# Patient Record
Sex: FEMALE | URBAN - METROPOLITAN AREA
[De-identification: names, ages, dates, MRNs, and addresses within clinical notes are randomized per-mention and may not be internally consistent; named-entity substitution may affect disease eponyms.]

---

## 2018-06-04 ENCOUNTER — APPOINTMENT (RX ONLY)
Dept: URBAN - METROPOLITAN AREA CLINIC 116 | Facility: CLINIC | Age: 78
Setting detail: DERMATOLOGY
End: 2018-06-04

## 2018-06-04 DIAGNOSIS — L29.89 OTHER PRURITUS: ICD-10-CM

## 2018-06-04 DIAGNOSIS — L23.9 ALLERGIC CONTACT DERMATITIS, UNSPECIFIED CAUSE: ICD-10-CM

## 2018-06-04 DIAGNOSIS — L82.1 OTHER SEBORRHEIC KERATOSIS: ICD-10-CM

## 2018-06-04 DIAGNOSIS — D69.2 OTHER NONTHROMBOCYTOPENIC PURPURA: ICD-10-CM

## 2018-06-04 DIAGNOSIS — L91.8 OTHER HYPERTROPHIC DISORDERS OF THE SKIN: ICD-10-CM

## 2018-06-04 PROBLEM — L29.8 OTHER PRURITUS: Status: ACTIVE | Noted: 2018-06-04

## 2018-06-04 PROBLEM — E78.5 HYPERLIPIDEMIA, UNSPECIFIED: Status: ACTIVE | Noted: 2018-06-04

## 2018-06-04 PROBLEM — Z92.3 PERSONAL HISTORY OF IRRADIATION: Status: ACTIVE | Noted: 2018-06-04

## 2018-06-04 PROBLEM — M12.9 ARTHROPATHY, UNSPECIFIED: Status: ACTIVE | Noted: 2018-06-04

## 2018-06-04 PROBLEM — I10 ESSENTIAL (PRIMARY) HYPERTENSION: Status: ACTIVE | Noted: 2018-06-04

## 2018-06-04 PROBLEM — Z85.3 PERSONAL HISTORY OF MALIGNANT NEOPLASM OF BREAST: Status: ACTIVE | Noted: 2018-06-04

## 2018-06-04 PROCEDURE — 99202 OFFICE O/P NEW SF 15 MIN: CPT

## 2018-06-04 PROCEDURE — ? COUNSELING

## 2018-06-04 PROCEDURE — ? TREATMENT REGIMEN

## 2018-06-04 PROCEDURE — ? PRESCRIPTION

## 2018-06-04 RX ORDER — TRIAMCINOLONE ACETONIDE 0.25 MG/G
CREAM TOPICAL
Qty: 1 | Refills: 0 | Status: ERX | COMMUNITY
Start: 2018-06-04

## 2018-06-04 RX ADMIN — TRIAMCINOLONE ACETONIDE: 0.25 CREAM TOPICAL at 15:22

## 2018-06-04 ASSESSMENT — LOCATION DETAILED DESCRIPTION DERM
LOCATION DETAILED: LEFT SUPERIOR LATERAL NECK
LOCATION DETAILED: LEFT PROXIMAL DORSAL FOREARM
LOCATION DETAILED: LEFT SUPERIOR ANTERIOR NECK
LOCATION DETAILED: RIGHT DISTAL DORSAL FOREARM
LOCATION DETAILED: LEFT DISTAL DORSAL FOREARM
LOCATION DETAILED: RIGHT SUPERIOR LATERAL NECK

## 2018-06-04 ASSESSMENT — LOCATION SIMPLE DESCRIPTION DERM
LOCATION SIMPLE: RIGHT FOREARM
LOCATION SIMPLE: LEFT FOREARM
LOCATION SIMPLE: LEFT ANTERIOR NECK
LOCATION SIMPLE: RIGHT ANTERIOR NECK

## 2018-06-04 ASSESSMENT — LOCATION ZONE DERM
LOCATION ZONE: NECK
LOCATION ZONE: ARM

## 2018-06-04 NOTE — PROCEDURE: TREATMENT REGIMEN
Detail Level: Zone
Discontinue Regimen: Harsh soaps (Dial, ivory, [place] spring) \\nTide and [employer] laundry detergent \\nBounce & downy dryer sheets / fabric sheets.
Initiate Treatment: Advised Pt to wash with gentle cleansers (Dove, Cetaphil, Aveeno) in [facility] warm water. Pt was also advised to use All free and clear laundry detergent. Pt was also advised to take a Zyrtec, Claritin or Allegra daily.

## 2020-06-16 ENCOUNTER — OFFICE VISIT (OUTPATIENT)
Dept: URBAN - METROPOLITAN AREA CLINIC 60 | Facility: CLINIC | Age: 80
End: 2020-06-16

## 2020-06-18 LAB
BASO (ABSOLUTE): (no result)
BASOS: (no result)
EOS (ABSOLUTE): (no result)
EOS: (no result)
FERRITIN, SERUM: (no result)
FOLATE (FOLIC ACID), SERUM: (no result)
HEMATOCRIT: (no result)
HEMATOLOGY COMMENTS:: (no result)
HEMOGLOBIN: (no result)
IMMATURE CELLS: (no result)
IMMATURE GRANS (ABS): (no result)
IMMATURE GRANULOCYTES: (no result)
IRON BIND.CAP.(TIBC): (no result)
IRON SATURATION: (no result)
IRON: (no result)
LYMPHS (ABSOLUTE): (no result)
LYMPHS: (no result)
MCH: (no result)
MCHC: (no result)
MCV: (no result)
MONOCYTES(ABSOLUTE): (no result)
MONOCYTES: (no result)
NEUTROPHILS (ABSOLUTE): (no result)
NEUTROPHILS: (no result)
NRBC: (no result)
PLATELETS: (no result)
RBC: (no result)
RDW: (no result)
UIBC: (no result)
VITAMIN B12: (no result)
WBC: (no result)

## 2020-07-22 LAB
A/G RATIO: 1.6
ALBUMIN: (no result)
ALKALINE PHOSPHATASE: (no result)
ALT (SGPT): (no result)
AST (SGOT): (no result)
BASO (ABSOLUTE): (no result)
BASOS: (no result)
BILIRUBIN, TOTAL: (no result)
BUN/CREATININE RATIO: 23
BUN: (no result)
CALCIUM: (no result)
CARBON DIOXIDE, TOTAL: (no result)
CHLORIDE: (no result)
CREATININE: (no result)
EGFR IF AFRICN AM: (no result)
EGFR IF NONAFRICN AM: (no result)
EOS (ABSOLUTE): (no result)
EOS: (no result)
FERRITIN, SERUM: (no result)
GLOBULIN, TOTAL: (no result)
GLUCOSE: (no result)
HEMATOCRIT: (no result)
HEMATOLOGY COMMENTS:: (no result)
HEMOGLOBIN: (no result)
IMMATURE CELLS: (no result)
IMMATURE GRANS (ABS): (no result)
IMMATURE GRANULOCYTES: (no result)
INR: 1
IRON BIND.CAP.(TIBC): (no result)
IRON SATURATION: (no result)
IRON: (no result)
LYMPHS (ABSOLUTE): (no result)
LYMPHS: (no result)
MCH: (no result)
MCHC: (no result)
MCV: (no result)
MONOCYTES(ABSOLUTE): (no result)
MONOCYTES: (no result)
NEUTROPHILS (ABSOLUTE): (no result)
NEUTROPHILS: (no result)
NRBC: (no result)
PLATELETS: (no result)
POTASSIUM: (no result)
PROTEIN, TOTAL: (no result)
PROTHROMBIN TIME: (no result)
RBC: (no result)
RDW: (no result)
SODIUM: (no result)
UIBC: (no result)
WBC: (no result)

## 2020-07-29 ENCOUNTER — OFFICE VISIT (OUTPATIENT)
Dept: URBAN - METROPOLITAN AREA SURGERY CENTER 4 | Facility: SURGERY CENTER | Age: 80
End: 2020-07-29

## 2020-07-31 LAB — PATHOLOGY (INDENTED REPORT): (no result)

## 2020-08-12 ENCOUNTER — OFFICE VISIT (OUTPATIENT)
Dept: URBAN - METROPOLITAN AREA CLINIC 63 | Facility: CLINIC | Age: 80
End: 2020-08-12

## 2022-07-09 ENCOUNTER — TELEPHONE ENCOUNTER (OUTPATIENT)
Dept: URBAN - METROPOLITAN AREA CLINIC 121 | Facility: CLINIC | Age: 82
End: 2022-07-09

## 2022-07-09 RX ORDER — POTASSIUM CHLORIDE 750 MG/1
TABLET, FILM COATED, EXTENDED RELEASE ORAL
Refills: 0 | OUTPATIENT
Start: 2013-04-10 | End: 2020-06-16

## 2022-07-09 RX ORDER — SIMVASTATIN 20 MG/1
TABLET, FILM COATED ORAL ONCE A DAY
Refills: 0 | OUTPATIENT
Start: 2020-06-15 | End: 2020-06-16

## 2022-07-09 RX ORDER — POTASSIUM CHLORIDE 750 MG/1
TABLET, EXTENDED RELEASE ORAL ONCE A DAY
Refills: 0 | OUTPATIENT
Start: 2020-06-15 | End: 2020-06-16

## 2022-07-09 RX ORDER — TRAVOPROST 0.04 MG/ML
SOLUTION/ DROPS OPHTHALMIC
Refills: 0 | OUTPATIENT
Start: 2020-06-15 | End: 2020-06-16

## 2022-07-09 RX ORDER — HYDROCHLOROTHIAZIDE 25 MG/1
TABLET ORAL
Refills: 0 | OUTPATIENT
Start: 2013-04-10 | End: 2020-06-15

## 2022-07-09 RX ORDER — METOPROLOL SUCCINATE 50 MG/1
TABLET, EXTENDED RELEASE ORAL
Refills: 0 | OUTPATIENT
Start: 2010-01-21 | End: 2013-04-10

## 2022-07-09 RX ORDER — IBUPROFEN 200 MG/1
TABLET, COATED ORAL
Refills: 0 | OUTPATIENT
Start: 2010-01-21 | End: 2013-04-10

## 2022-07-09 RX ORDER — TRIAMTERENE AND HYDROCHLOROTHIAZIDE 37.5; 25 MG/1; MG/1
CAPSULE ORAL
Refills: 0 | OUTPATIENT
Start: 2010-01-21 | End: 2013-04-10

## 2022-07-09 RX ORDER — GABAPENTIN 100 MG/1
CAPSULE ORAL THREE TIMES A DAY
Refills: 0 | OUTPATIENT
Start: 2020-06-15 | End: 2020-06-16

## 2022-07-09 RX ORDER — GABAPENTIN 600 MG/1
TABLET ORAL
Refills: 0 | OUTPATIENT
Start: 2010-01-21 | End: 2013-04-10

## 2022-07-09 RX ORDER — AMLODIPINE BESYLATE 10 MG/1
TABLET ORAL
Refills: 0 | OUTPATIENT
Start: 2013-04-10 | End: 2020-06-16

## 2022-07-09 RX ORDER — DICYCLOMINE HYDROCHLORIDE 20 MG/1
PRN TABLET ORAL TAKE AS DIRECTED
Refills: 0 | OUTPATIENT
Start: 2020-06-15 | End: 2020-06-16

## 2022-07-09 RX ORDER — LISINOPRIL 20 MG/1
TABLET ORAL
Refills: 0 | OUTPATIENT
Start: 2010-01-21 | End: 2013-04-10

## 2022-07-09 RX ORDER — ESOMEPRAZOLE MAGNESIUM 40 MG
CAPSULE,DELAYED RELEASE (ENTERIC COATED) ORAL
Refills: 0 | OUTPATIENT
Start: 2013-04-10 | End: 2020-06-15

## 2022-07-09 RX ORDER — CLONIDINE HYDROCHLORIDE 0.1 MG/1
PRN >160 SBP TABLET ORAL ONCE A DAY
Refills: 0 | OUTPATIENT
Start: 2020-06-15 | End: 2020-06-16

## 2022-07-09 RX ORDER — LOSARTAN POTASSIUM 100 MG/1
TABLET, FILM COATED ORAL
Refills: 0 | OUTPATIENT
Start: 2013-04-10 | End: 2020-06-16

## 2022-07-09 RX ORDER — TRAVOPROST 0.04 MG/ML
SOLUTION/ DROPS OPHTHALMIC
Refills: 0 | OUTPATIENT
Start: 2013-04-10 | End: 2020-06-16

## 2022-07-09 RX ORDER — OMEPRAZOLE 20 MG/1
TABLET, DELAYED RELEASE ORAL
Refills: 0 | OUTPATIENT
Start: 2010-01-21 | End: 2013-04-10

## 2022-07-09 RX ORDER — POLYETHYLENE GLYCOL 1450
POWDER (GRAM) MISCELLANEOUS ONCE A DAY
Refills: 0 | OUTPATIENT
Start: 2020-06-15 | End: 2020-06-16

## 2022-07-09 RX ORDER — LATANOPROST/PF 0.005 %
DROPS OPHTHALMIC (EYE) ONCE A DAY
Refills: 0 | OUTPATIENT
Start: 2020-06-15 | End: 2020-06-16

## 2022-07-09 RX ORDER — SIMVASTATIN 20 MG/1
TABLET, FILM COATED ORAL
Refills: 0 | OUTPATIENT
Start: 2010-01-21 | End: 2013-04-10

## 2022-07-09 RX ORDER — OMEPRAZOLE 20 MG/1
TABLET, ORALLY DISINTEGRATING, DELAYED RELEASE ORAL ONCE A DAY
Refills: 0 | OUTPATIENT
Start: 2020-06-15 | End: 2020-06-16

## 2022-07-10 ENCOUNTER — TELEPHONE ENCOUNTER (OUTPATIENT)
Dept: URBAN - METROPOLITAN AREA CLINIC 121 | Facility: CLINIC | Age: 82
End: 2022-07-10

## 2022-07-10 RX ORDER — TRIAMTERENE AND HYDROCHLOROTHIAZIDE 37.5; 25 MG/1; MG/1
CAPSULE ORAL
Refills: 0 | Status: ACTIVE | COMMUNITY
Start: 2009-12-17

## 2022-07-10 RX ORDER — GABAPENTIN 600 MG/1
TABLET ORAL
Refills: 0 | Status: ACTIVE | COMMUNITY
Start: 2013-04-10

## 2022-07-10 RX ORDER — OMEPRAZOLE 20 MG/1
TABLET, DELAYED RELEASE ORAL
Refills: 0 | Status: ACTIVE | COMMUNITY
Start: 2009-12-17

## 2022-07-10 RX ORDER — METOPROLOL SUCCINATE 50 MG/1
TABLET, EXTENDED RELEASE ORAL
Refills: 0 | Status: ACTIVE | COMMUNITY
Start: 2009-12-17

## 2022-07-10 RX ORDER — IBUPROFEN 200 MG/1
TABLET, COATED ORAL
Refills: 0 | Status: ACTIVE | COMMUNITY
Start: 2009-12-17

## 2022-07-10 RX ORDER — SIMVASTATIN 20 MG/1
TABLET, FILM COATED ORAL
Refills: 0 | Status: ACTIVE | COMMUNITY
Start: 2009-12-17

## 2022-07-10 RX ORDER — OMEPRAZOLE 20 MG/1
TABLET, DELAYED RELEASE ORAL
Refills: 6 | Status: ACTIVE | COMMUNITY
Start: 2010-01-21

## 2022-07-10 RX ORDER — LATANOPROST/PF 0.005 %
DROPS OPHTHALMIC (EYE) ONCE A DAY
Refills: 0 | Status: ACTIVE | COMMUNITY
Start: 2020-06-16

## 2022-07-10 RX ORDER — TRAVOPROST 0.04 MG/ML
SOLUTION/ DROPS OPHTHALMIC
Refills: 0 | Status: ACTIVE | COMMUNITY
Start: 2020-06-16

## 2022-07-10 RX ORDER — POTASSIUM CHLORIDE 750 MG/1
TABLET, EXTENDED RELEASE ORAL ONCE A DAY
Refills: 0 | Status: ACTIVE | COMMUNITY
Start: 2020-06-16

## 2022-07-10 RX ORDER — DICYCLOMINE HYDROCHLORIDE 20 MG/1
PRN TABLET ORAL TAKE AS DIRECTED
Refills: 0 | Status: ACTIVE | COMMUNITY
Start: 2020-06-16

## 2022-07-10 RX ORDER — OMEPRAZOLE 20 MG/1
TABLET, ORALLY DISINTEGRATING, DELAYED RELEASE ORAL ONCE A DAY
Refills: 0 | Status: ACTIVE | COMMUNITY
Start: 2020-06-16

## 2022-07-10 RX ORDER — SIMVASTATIN 20 MG/1
TABLET, FILM COATED ORAL
Refills: 0 | Status: ACTIVE | COMMUNITY
Start: 2013-04-10

## 2022-07-10 RX ORDER — POLYETHYLENE GLYCOL 1450
POWDER (GRAM) MISCELLANEOUS ONCE A DAY
Refills: 0 | Status: ACTIVE | COMMUNITY
Start: 2020-06-16

## 2022-07-10 RX ORDER — GABAPENTIN 100 MG/1
CAPSULE ORAL THREE TIMES A DAY
Refills: 0 | Status: ACTIVE | COMMUNITY
Start: 2020-06-16

## 2022-07-10 RX ORDER — LISINOPRIL 20 MG/1
TABLET ORAL
Refills: 0 | Status: ACTIVE | COMMUNITY
Start: 2009-12-17

## 2022-07-10 RX ORDER — CLONIDINE HYDROCHLORIDE 0.1 MG/1
PRN >160 SBP TABLET ORAL ONCE A DAY
Refills: 0 | Status: ACTIVE | COMMUNITY
Start: 2020-06-16

## 2022-07-10 RX ORDER — SIMVASTATIN 20 MG/1
TABLET, FILM COATED ORAL ONCE A DAY
Refills: 0 | Status: ACTIVE | COMMUNITY
Start: 2020-06-16

## 2022-08-03 ENCOUNTER — TELEPHONE ENCOUNTER (OUTPATIENT)
Dept: URBAN - METROPOLITAN AREA CLINIC 63 | Facility: CLINIC | Age: 82
End: 2022-08-03

## 2022-08-04 ENCOUNTER — TELEPHONE ENCOUNTER (OUTPATIENT)
Dept: URBAN - METROPOLITAN AREA CLINIC 63 | Facility: CLINIC | Age: 82
End: 2022-08-04

## 2022-08-08 ENCOUNTER — WEB ENCOUNTER (OUTPATIENT)
Dept: URBAN - METROPOLITAN AREA CLINIC 63 | Facility: CLINIC | Age: 82
End: 2022-08-08

## 2022-08-08 ENCOUNTER — OFFICE VISIT (OUTPATIENT)
Dept: URBAN - METROPOLITAN AREA CLINIC 63 | Facility: CLINIC | Age: 82
End: 2022-08-08
Payer: COMMERCIAL

## 2022-08-08 VITALS
HEIGHT: 59 IN | OXYGEN SATURATION: 98 % | BODY MASS INDEX: 35.96 KG/M2 | SYSTOLIC BLOOD PRESSURE: 136 MMHG | WEIGHT: 178.4 LBS | TEMPERATURE: 98.3 F | DIASTOLIC BLOOD PRESSURE: 79 MMHG | HEART RATE: 64 BPM

## 2022-08-08 DIAGNOSIS — K92.1 HEMATOCHEZIA: ICD-10-CM

## 2022-08-08 PROCEDURE — 99214 OFFICE O/P EST MOD 30 MIN: CPT | Performed by: NURSE PRACTITIONER

## 2022-08-08 RX ORDER — GABAPENTIN 300 MG/1
1 CAPSULE CAPSULE ORAL BID
Refills: 0 | Status: ACTIVE | COMMUNITY
Start: 2013-04-10

## 2022-08-08 RX ORDER — HYDROCHLOROTHIAZIDE 25 MG/1
1 TABLET IN THE MORNING TABLET ORAL ONCE A DAY
Status: ACTIVE | COMMUNITY

## 2022-08-08 RX ORDER — POLYETHYLENE GLYCOL 1450
POWDER (GRAM) MISCELLANEOUS ONCE A DAY
Refills: 0 | Status: ACTIVE | COMMUNITY
Start: 2020-06-16

## 2022-08-08 RX ORDER — OMEPRAZOLE 20 MG/1
1 TABLET 30 MINUTES BEFORE MORNING MEAL TABLET, DELAYED RELEASE ORAL ONCE A DAY
Refills: 0 | Status: ACTIVE | COMMUNITY
Start: 2009-12-17

## 2022-08-08 RX ORDER — TRAVOPROST 0.04 MG/ML
SOLUTION/ DROPS OPHTHALMIC
Refills: 0 | Status: ACTIVE | COMMUNITY
Start: 2020-06-16

## 2022-08-08 RX ORDER — CLONIDINE HYDROCHLORIDE 0.1 MG/1
1 TABLET TABLET ORAL PRN
Refills: 0 | Status: ACTIVE | COMMUNITY
Start: 2020-06-16

## 2022-08-08 RX ORDER — ATORVASTATIN CALCIUM 40 MG/1
1 TABLET TABLET, FILM COATED ORAL ONCE A DAY
Status: ACTIVE | COMMUNITY

## 2022-08-08 RX ORDER — DICYCLOMINE HYDROCHLORIDE 20 MG/1
PRN TABLET ORAL TAKE AS DIRECTED
Refills: 0 | Status: ACTIVE | COMMUNITY
Start: 2020-06-16

## 2022-08-08 RX ORDER — SACUBITRIL AND VALSARTAN 24; 26 MG/1; MG/1
1 TABLET TABLET, FILM COATED ORAL TWICE A DAY
Status: ACTIVE | COMMUNITY

## 2022-08-08 RX ORDER — FUROSEMIDE 20 MG/1
1 TABLET TABLET ORAL ONCE A DAY
Status: ACTIVE | COMMUNITY

## 2022-08-08 RX ORDER — LATANOPROST/PF 0.005 %
DROPS OPHTHALMIC (EYE) ONCE A DAY
Refills: 0 | Status: ACTIVE | COMMUNITY
Start: 2020-06-16

## 2022-08-08 NOTE — HPI-TODAY'S VISIT:
This is an 82-year-old female who presents to the office for evaluation of blood in her stool.  She was last seen by us in June 2020.  Today she reports that she started a medication on 6/13/22 for chronic UTIs (Trospium) and then started seeing blood in her stools. She stopped the medication and she has not seen any further bleeding. She denies any current bowel problems. No constipation, diarrhea, abdominal pain or weight loss. No heartburn or reflux if she takes her Omeprazole. Her appetite is good.  When last seen in the office she was anemic and heme positive.  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  An EGD colonoscopy were ordered.  EGD on 7/29/2020 was essentially unremarkable for cause.  However her colonoscopy on that date revealed a 15mm polypoid, multilobulated lesion in the distal rectum.  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  A further 10 mm polyp removed from the rectum was a tubular adenoma.  She canceled her follow-up appointment due to a family member having COVID and did not call back to reschedule.  Recalls were sent to her which she did not respond to. She states today that she felt well and so didn't think she had to come in. Of note is that she is a poor historian. She reports undergoing iron infusions periodically for anemia. She cannot recall with whom, or when her last one was. She also has a cardiologist that she sees every 6 months, but isn't sure why. She denies stents or MI.

## 2022-08-08 NOTE — HPI-PREVIOUS PROCEDURES
Colonoscopy in July 2020 revealed a 15mm polypoid, multilobulated lesion in the distal rectum.  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  A further 10 mm polyp removed from the rectum was a tubular adenoma.

## 2022-08-15 ENCOUNTER — LAB OUTSIDE AN ENCOUNTER (OUTPATIENT)
Dept: URBAN - METROPOLITAN AREA CLINIC 63 | Facility: CLINIC | Age: 82
End: 2022-08-15

## 2022-08-15 ENCOUNTER — TELEPHONE ENCOUNTER (OUTPATIENT)
Dept: URBAN - METROPOLITAN AREA CLINIC 63 | Facility: CLINIC | Age: 82
End: 2022-08-15

## 2022-08-17 ENCOUNTER — OFFICE VISIT (OUTPATIENT)
Dept: URBAN - METROPOLITAN AREA SURGERY CENTER 4 | Facility: SURGERY CENTER | Age: 82
End: 2022-08-17

## 2022-08-23 ENCOUNTER — OFFICE VISIT (OUTPATIENT)
Dept: URBAN - METROPOLITAN AREA SURGERY CENTER 4 | Facility: SURGERY CENTER | Age: 82
End: 2022-08-23

## 2022-08-23 ENCOUNTER — CLAIMS CREATED FROM THE CLAIM WINDOW (OUTPATIENT)
Dept: URBAN - METROPOLITAN AREA CLINIC 61 | Facility: CLINIC | Age: 82
End: 2022-08-23
Payer: COMMERCIAL

## 2022-08-23 ENCOUNTER — CLAIMS CREATED FROM THE CLAIM WINDOW (OUTPATIENT)
Dept: URBAN - METROPOLITAN AREA SURGERY CENTER 4 | Facility: SURGERY CENTER | Age: 82
End: 2022-08-23
Payer: COMMERCIAL

## 2022-08-23 DIAGNOSIS — K55.20 ANGIODYSPLASIA OF COLON WITHOUT BLEEDING: ICD-10-CM

## 2022-08-23 DIAGNOSIS — K62.89 RECTAL MASS: ICD-10-CM

## 2022-08-23 DIAGNOSIS — D12.0 POLYP OF CECUM: ICD-10-CM

## 2022-08-23 DIAGNOSIS — K62.1 RECTAL POLYP: ICD-10-CM

## 2022-08-23 DIAGNOSIS — K62.5 RECTAL BLEEDING: ICD-10-CM

## 2022-08-23 DIAGNOSIS — D49.0 NEOPLASM OF UNSPECIFIED BEHAVIOR OF DIGESTIVE SYSTEM: ICD-10-CM

## 2022-08-23 DIAGNOSIS — D12.8 BENIGN NEOPLASM OF RECTUM: ICD-10-CM

## 2022-08-23 PROCEDURE — 45380 COLONOSCOPY AND BIOPSY: CPT | Performed by: INTERNAL MEDICINE

## 2022-08-23 PROCEDURE — 45385 COLONOSCOPY W/LESION REMOVAL: CPT | Performed by: INTERNAL MEDICINE

## 2022-08-23 PROCEDURE — 45385 COLONOSCOPY W/LESION REMOVAL: CPT | Performed by: CLINIC/CENTER

## 2022-08-23 PROCEDURE — G8907 PT DOC NO EVENTS ON DISCHARG: HCPCS | Performed by: CLINIC/CENTER

## 2022-08-23 PROCEDURE — 88305 TISSUE EXAM BY PATHOLOGIST: CPT | Performed by: INTERNAL MEDICINE

## 2022-08-23 PROCEDURE — G8918 PT W/O PREOP ORDER IV AB PRO: HCPCS | Performed by: CLINIC/CENTER

## 2022-08-23 PROCEDURE — 45380 COLONOSCOPY AND BIOPSY: CPT | Performed by: CLINIC/CENTER

## 2022-08-23 RX ORDER — CLONIDINE HYDROCHLORIDE 0.1 MG/1
1 TABLET TABLET ORAL PRN
Refills: 0 | Status: ACTIVE | COMMUNITY
Start: 2020-06-16

## 2022-08-23 RX ORDER — GABAPENTIN 300 MG/1
1 CAPSULE CAPSULE ORAL BID
Refills: 0 | Status: ACTIVE | COMMUNITY
Start: 2013-04-10

## 2022-08-23 RX ORDER — POLYETHYLENE GLYCOL 1450
POWDER (GRAM) MISCELLANEOUS ONCE A DAY
Refills: 0 | Status: ACTIVE | COMMUNITY
Start: 2020-06-16

## 2022-08-23 RX ORDER — TRAVOPROST 0.04 MG/ML
SOLUTION/ DROPS OPHTHALMIC
Refills: 0 | Status: ACTIVE | COMMUNITY
Start: 2020-06-16

## 2022-08-23 RX ORDER — ATORVASTATIN CALCIUM 40 MG/1
1 TABLET TABLET, FILM COATED ORAL ONCE A DAY
Status: ACTIVE | COMMUNITY

## 2022-08-23 RX ORDER — HYDROCHLOROTHIAZIDE 25 MG/1
1 TABLET IN THE MORNING TABLET ORAL ONCE A DAY
Status: ACTIVE | COMMUNITY

## 2022-08-23 RX ORDER — OMEPRAZOLE 20 MG/1
1 TABLET 30 MINUTES BEFORE MORNING MEAL TABLET, DELAYED RELEASE ORAL ONCE A DAY
Refills: 0 | Status: ACTIVE | COMMUNITY
Start: 2009-12-17

## 2022-08-23 RX ORDER — DICYCLOMINE HYDROCHLORIDE 20 MG/1
PRN TABLET ORAL TAKE AS DIRECTED
Refills: 0 | Status: ACTIVE | COMMUNITY
Start: 2020-06-16

## 2022-08-23 RX ORDER — LATANOPROST/PF 0.005 %
DROPS OPHTHALMIC (EYE) ONCE A DAY
Refills: 0 | Status: ACTIVE | COMMUNITY
Start: 2020-06-16

## 2022-08-23 RX ORDER — FUROSEMIDE 20 MG/1
1 TABLET TABLET ORAL ONCE A DAY
Status: ACTIVE | COMMUNITY

## 2022-08-23 RX ORDER — SACUBITRIL AND VALSARTAN 24; 26 MG/1; MG/1
1 TABLET TABLET, FILM COATED ORAL TWICE A DAY
Status: ACTIVE | COMMUNITY

## 2022-08-31 ENCOUNTER — TELEPHONE ENCOUNTER (OUTPATIENT)
Dept: URBAN - METROPOLITAN AREA CLINIC 63 | Facility: CLINIC | Age: 82
End: 2022-08-31

## 2022-08-31 ENCOUNTER — OFFICE VISIT (OUTPATIENT)
Dept: URBAN - METROPOLITAN AREA CLINIC 63 | Facility: CLINIC | Age: 82
End: 2022-08-31

## 2022-09-01 ENCOUNTER — TELEPHONE ENCOUNTER (OUTPATIENT)
Dept: URBAN - METROPOLITAN AREA CLINIC 63 | Facility: CLINIC | Age: 82
End: 2022-09-01

## 2022-09-06 ENCOUNTER — OFFICE VISIT (OUTPATIENT)
Dept: URBAN - METROPOLITAN AREA CLINIC 60 | Facility: CLINIC | Age: 82
End: 2022-09-06
Payer: COMMERCIAL

## 2022-09-06 ENCOUNTER — WEB ENCOUNTER (OUTPATIENT)
Dept: URBAN - METROPOLITAN AREA CLINIC 60 | Facility: CLINIC | Age: 82
End: 2022-09-06

## 2022-09-06 VITALS
OXYGEN SATURATION: 95 % | TEMPERATURE: 97 F | HEIGHT: 59 IN | HEART RATE: 85 BPM | BODY MASS INDEX: 36 KG/M2 | SYSTOLIC BLOOD PRESSURE: 128 MMHG | DIASTOLIC BLOOD PRESSURE: 84 MMHG | WEIGHT: 178.6 LBS

## 2022-09-06 DIAGNOSIS — K62.1 RECTAL POLYP: ICD-10-CM

## 2022-09-06 PROCEDURE — 99213 OFFICE O/P EST LOW 20 MIN: CPT | Performed by: NURSE PRACTITIONER

## 2022-09-06 RX ORDER — FUROSEMIDE 20 MG/1
1 TABLET TABLET ORAL ONCE A DAY
COMMUNITY

## 2022-09-06 RX ORDER — TRAVOPROST 0.04 MG/ML
SOLUTION/ DROPS OPHTHALMIC
Refills: 0 | COMMUNITY
Start: 2020-06-16

## 2022-09-06 RX ORDER — POLYETHYLENE GLYCOL 1450
POWDER (GRAM) MISCELLANEOUS ONCE A DAY
Refills: 0 | COMMUNITY
Start: 2020-06-16

## 2022-09-06 RX ORDER — GABAPENTIN 300 MG/1
1 CAPSULE CAPSULE ORAL BID
Refills: 0 | COMMUNITY
Start: 2013-04-10

## 2022-09-06 RX ORDER — CLONIDINE HYDROCHLORIDE 0.1 MG/1
1 TABLET TABLET ORAL PRN
Refills: 0 | COMMUNITY
Start: 2020-06-16

## 2022-09-06 RX ORDER — SACUBITRIL AND VALSARTAN 24; 26 MG/1; MG/1
1 TABLET TABLET, FILM COATED ORAL TWICE A DAY
COMMUNITY

## 2022-09-06 RX ORDER — DICYCLOMINE HYDROCHLORIDE 20 MG/1
PRN TABLET ORAL TAKE AS DIRECTED
Refills: 0 | COMMUNITY
Start: 2020-06-16

## 2022-09-06 RX ORDER — LATANOPROST/PF 0.005 %
DROPS OPHTHALMIC (EYE) ONCE A DAY
Refills: 0 | COMMUNITY
Start: 2020-06-16

## 2022-09-06 RX ORDER — OMEPRAZOLE 20 MG/1
1 TABLET 30 MINUTES BEFORE MORNING MEAL TABLET, DELAYED RELEASE ORAL ONCE A DAY
Refills: 0 | COMMUNITY
Start: 2009-12-17

## 2022-09-06 RX ORDER — ATORVASTATIN CALCIUM 40 MG/1
1 TABLET TABLET, FILM COATED ORAL ONCE A DAY
COMMUNITY

## 2022-09-06 RX ORDER — HYDROCHLOROTHIAZIDE 25 MG/1
1 TABLET IN THE MORNING TABLET ORAL ONCE A DAY
COMMUNITY

## 2022-09-06 NOTE — HPI-TODAY'S VISIT:
This is an 82-year-old female who presents to the office for follow-up after recent colonoscopy.  Today she reports doing well. She always has a lot "noise and gas" in her abdomen, but denies abdominal pain, appetite change or weight loss.

## 2022-09-06 NOTE — HPI-PREVIOUS PROCEDURES
Findings included a 12 mm sessile tubular adenoma removed from the cecum and a 10 mm tubular adenoma in the proximal rectum.   This polyp spreads from the distal rectum into the anal canal the polyp was sessile and removed with a hot snare. There was a 25 mm polypoid lesion in the distal rectum near the anal canal which was multilobulated and biopsied.  Pathology reports this to be tubulovillous adenoma.  A single small localized AVM was found in the proximal ascending colon, nonbleeding.  Diverticulosis was found in the descending and sigmoid colon and small internal hemorrhoids noted.  To review, this patient had a colonoscopy in July 2020 and the large lobulated lesion in the rectum was noted at that time.  The pathology at that time reported tubulovillous adenoma with high-grade dysplasia.  She did not keep her follow-up at that time as her daughter had COVID and she did not respond to our subsequent recalls.  Recently she presented to our office for reports of blood in her stool.

## 2022-09-06 NOTE — HPI-PREVIOUS LABS
She obtained the CBC CMP iron studies and PT/INR study, but did not do the CEA due to financial concerns.

## 2022-10-12 ENCOUNTER — OFFICE VISIT (OUTPATIENT)
Dept: URBAN - METROPOLITAN AREA SURGERY CENTER 4 | Facility: SURGERY CENTER | Age: 82
End: 2022-10-12

## 2022-10-26 ENCOUNTER — OFFICE VISIT (OUTPATIENT)
Dept: URBAN - METROPOLITAN AREA CLINIC 63 | Facility: CLINIC | Age: 82
End: 2022-10-26

## 2023-08-28 NOTE — PHYSICAL EXAM HENT:
Head , atraumatic Left message to return call and that writer will respond via MyC message to pt since writer was not able to a hold of pt via phone.    Please route to RN queue to gather more info when pt returns call.    REN EstradaN, RN  [facility]

## 2023-12-27 ENCOUNTER — OFFICE VISIT (OUTPATIENT)
Dept: URBAN - METROPOLITAN AREA CLINIC 63 | Facility: CLINIC | Age: 83
End: 2023-12-27
Payer: COMMERCIAL

## 2023-12-27 ENCOUNTER — DASHBOARD ENCOUNTERS (OUTPATIENT)
Age: 83
End: 2023-12-27

## 2023-12-27 VITALS
DIASTOLIC BLOOD PRESSURE: 72 MMHG | WEIGHT: 175 LBS | RESPIRATION RATE: 20 BRPM | TEMPERATURE: 97.3 F | SYSTOLIC BLOOD PRESSURE: 130 MMHG | BODY MASS INDEX: 35.28 KG/M2 | HEIGHT: 59 IN | OXYGEN SATURATION: 96 % | HEART RATE: 58 BPM

## 2023-12-27 DIAGNOSIS — Z86.010 HISTORY OF COLON POLYPS: ICD-10-CM

## 2023-12-27 PROBLEM — 428283002: Status: ACTIVE | Noted: 2023-12-27

## 2023-12-27 PROCEDURE — 99214 OFFICE O/P EST MOD 30 MIN: CPT | Performed by: NURSE PRACTITIONER

## 2023-12-27 RX ORDER — TRAVOPROST 0.04 MG/ML
SOLUTION/ DROPS OPHTHALMIC
Refills: 0 | Status: ACTIVE | COMMUNITY
Start: 2020-06-16

## 2023-12-27 RX ORDER — FUROSEMIDE 20 MG/1
1 TABLET TABLET ORAL ONCE A DAY
Status: ACTIVE | COMMUNITY

## 2023-12-27 RX ORDER — OMEPRAZOLE 20 MG/1
1 TABLET 30 MINUTES BEFORE MORNING MEAL TABLET, DELAYED RELEASE ORAL ONCE A DAY
Refills: 0 | Status: ACTIVE | COMMUNITY
Start: 2009-12-17

## 2023-12-27 RX ORDER — LATANOPROST/PF 0.005 %
DROPS OPHTHALMIC (EYE) ONCE A DAY
Refills: 0 | Status: ACTIVE | COMMUNITY
Start: 2020-06-16

## 2023-12-27 RX ORDER — DICYCLOMINE HYDROCHLORIDE 20 MG/1
PRN TABLET ORAL TAKE AS DIRECTED
Refills: 0 | Status: ACTIVE | COMMUNITY
Start: 2020-06-16

## 2023-12-27 RX ORDER — CLONIDINE HYDROCHLORIDE 0.1 MG/1
1 TABLET TABLET ORAL PRN
Refills: 0 | Status: ACTIVE | COMMUNITY
Start: 2020-06-16

## 2023-12-27 RX ORDER — HYDROCHLOROTHIAZIDE 25 MG/1
1 TABLET IN THE MORNING TABLET ORAL ONCE A DAY
Status: ACTIVE | COMMUNITY

## 2023-12-27 RX ORDER — SACUBITRIL AND VALSARTAN 49; 51 MG/1; MG/1
49-51MG TABLET, FILM COATED ORAL TWICE A DAY
Status: ACTIVE | COMMUNITY

## 2023-12-27 RX ORDER — GABAPENTIN 300 MG/1
1 CAPSULE CAPSULE ORAL BID
Refills: 0 | Status: ACTIVE | COMMUNITY
Start: 2023-12-09

## 2023-12-27 RX ORDER — ATORVASTATIN CALCIUM 40 MG/1
1 TABLET TABLET, FILM COATED ORAL ONCE A DAY
Status: ACTIVE | COMMUNITY

## 2023-12-27 NOTE — HPI-TODAY'S VISIT:
This is an 83-year-old female patient with a history of a large rectal tubulovillous adenoma who presents to the office for follow-up. She was last seen on 9/6/2022.  Today she reportsShe denies any current bowel problems. No diarrhea, constipation, changes in bowel habits, abdominal pain, rectal bleeding or weight loss.  When last seen in 2022 we reviewed her colonoscopy. This revealed a 25 mm polypoid lesion in the distal rectum near the anal canal which was multilobulated.  She was referred to Dr. Farley for surgical resection and underwent transanal excision of this mass in October 2022. Pathology reported tubular adenoma with focal high-grade dysplasia. Dr. Farley recommended that she undergo a flexible sigmoidoscopy with us in between February 2023 and February 2024. She was also advised to follow-up with Dr. Cardenas in February 2024.

## 2023-12-27 NOTE — PHYSICAL EXAM SKIN:
no rashes , no suspicious lesions , no jaundice present
No respiratory distress. No stridor, Lungs sounds clear with good aeration bilaterally.

## 2023-12-27 NOTE — HPI-PREVIOUS PROCEDURES
Colonoscopy in 2022 revealed a 25 mm polypoid lesion in the distal rectum near the anal canal which was multilobulated.  To review her history. This patient had a colonoscopy in July 2020 and the large lobulated lesion in the rectum was noted at that time. The pathology reported tubulovillous adenoma with high-grade dysplasia. She did not keep her follow-up at that time and did not respond to a subsequent recalls. She then presented to our office with reports of blood in her stool and underwent repeat colonoscopy with findings as above.

## 2024-01-03 ENCOUNTER — LAB OUTSIDE AN ENCOUNTER (OUTPATIENT)
Dept: URBAN - METROPOLITAN AREA CLINIC 63 | Facility: CLINIC | Age: 84
End: 2024-01-03

## 2024-01-23 ENCOUNTER — TELEPHONE ENCOUNTER (OUTPATIENT)
Dept: URBAN - METROPOLITAN AREA CLINIC 63 | Facility: CLINIC | Age: 84
End: 2024-01-23

## 2024-02-07 ENCOUNTER — FLEX SIG (OUTPATIENT)
Dept: URBAN - METROPOLITAN AREA SURGERY CENTER 4 | Facility: SURGERY CENTER | Age: 84
End: 2024-02-07

## 2024-03-06 ENCOUNTER — LAB (OUTPATIENT)
Dept: URBAN - METROPOLITAN AREA CLINIC 4 | Facility: CLINIC | Age: 84
End: 2024-03-06
Payer: COMMERCIAL

## 2024-03-06 ENCOUNTER — FLEX SIG (OUTPATIENT)
Dept: URBAN - METROPOLITAN AREA SURGERY CENTER 4 | Facility: SURGERY CENTER | Age: 84
End: 2024-03-06
Payer: COMMERCIAL

## 2024-03-06 DIAGNOSIS — K63.89 OTHER SPECIFIED DISEASES OF INTESTINE: ICD-10-CM

## 2024-03-06 DIAGNOSIS — Z86.010 PERSONAL HISTORY OF COLONIC POLYPS: ICD-10-CM

## 2024-03-06 DIAGNOSIS — K64.1 SECOND DEGREE HEMORRHOIDS: ICD-10-CM

## 2024-03-06 PROCEDURE — 45378 DIAGNOSTIC COLONOSCOPY: CPT | Performed by: INTERNAL MEDICINE

## 2024-03-06 PROCEDURE — 88305 TISSUE EXAM BY PATHOLOGIST: CPT | Performed by: PATHOLOGY

## 2024-03-06 RX ORDER — CLONIDINE HYDROCHLORIDE 0.1 MG/1
1 TABLET TABLET ORAL PRN
Refills: 0 | Status: ACTIVE | COMMUNITY
Start: 2020-06-16

## 2024-03-06 RX ORDER — LATANOPROST/PF 0.005 %
DROPS OPHTHALMIC (EYE) ONCE A DAY
Refills: 0 | Status: ACTIVE | COMMUNITY
Start: 2020-06-16

## 2024-03-06 RX ORDER — DICYCLOMINE HYDROCHLORIDE 20 MG/1
PRN TABLET ORAL TAKE AS DIRECTED
Refills: 0 | Status: ACTIVE | COMMUNITY
Start: 2020-06-16

## 2024-03-06 RX ORDER — FUROSEMIDE 20 MG/1
1 TABLET TABLET ORAL ONCE A DAY
Status: ACTIVE | COMMUNITY

## 2024-03-06 RX ORDER — OMEPRAZOLE 20 MG/1
1 TABLET 30 MINUTES BEFORE MORNING MEAL TABLET, DELAYED RELEASE ORAL ONCE A DAY
Refills: 0 | Status: ACTIVE | COMMUNITY
Start: 2009-12-17

## 2024-03-06 RX ORDER — TRAVOPROST 0.04 MG/ML
SOLUTION/ DROPS OPHTHALMIC
Refills: 0 | Status: ACTIVE | COMMUNITY
Start: 2020-06-16

## 2024-03-06 RX ORDER — SACUBITRIL AND VALSARTAN 49; 51 MG/1; MG/1
49-51MG TABLET, FILM COATED ORAL TWICE A DAY
Status: ACTIVE | COMMUNITY

## 2024-03-06 RX ORDER — ATORVASTATIN CALCIUM 40 MG/1
1 TABLET TABLET, FILM COATED ORAL ONCE A DAY
Status: ACTIVE | COMMUNITY

## 2024-03-06 RX ORDER — GABAPENTIN 300 MG/1
1 CAPSULE CAPSULE ORAL BID
Refills: 0 | Status: ACTIVE | COMMUNITY
Start: 2023-12-09

## 2024-03-06 RX ORDER — HYDROCHLOROTHIAZIDE 25 MG/1
1 TABLET IN THE MORNING TABLET ORAL ONCE A DAY
Status: ACTIVE | COMMUNITY